# Patient Record
Sex: MALE | Race: WHITE
[De-identification: names, ages, dates, MRNs, and addresses within clinical notes are randomized per-mention and may not be internally consistent; named-entity substitution may affect disease eponyms.]

---

## 2018-02-02 ENCOUNTER — HOSPITAL ENCOUNTER (OUTPATIENT)
Dept: HOSPITAL 68 - STS | Age: 68
End: 2018-02-02
Attending: COLON & RECTAL SURGERY
Payer: COMMERCIAL

## 2018-02-02 VITALS — HEIGHT: 70 IN | BODY MASS INDEX: 23.62 KG/M2 | WEIGHT: 165 LBS

## 2018-02-02 DIAGNOSIS — I25.10: ICD-10-CM

## 2018-02-02 DIAGNOSIS — I10: ICD-10-CM

## 2018-02-02 DIAGNOSIS — Z79.82: ICD-10-CM

## 2018-02-02 DIAGNOSIS — K21.9: ICD-10-CM

## 2018-02-02 DIAGNOSIS — R15.9: Primary | ICD-10-CM

## 2018-02-02 PROCEDURE — C1894 INTRO/SHEATH, NON-LASER: HCPCS

## 2018-02-02 PROCEDURE — C1778 LEAD, NEUROSTIMULATOR: HCPCS

## 2018-02-02 NOTE — RADIOLOGY REPORT
EXAMINATION:
Intraoperative fluoroscopy
 
CLINICAL INFORMATION:
Sacral stimulator placement
 
COMPARISON:
None.
 
TECHNIQUE:
Intraoperative fluoroscopy was provided for use by Dr. Drake.  A total of 6
images were saved to PACS.
 
TOTAL FLUOROSCOPIC TIME:
0.7 minutes
 
FINDINGS\E\IMPRESSION:
Intraoperative fluoroscopy provided for use by Dr. Drake.  Please see
operative note for detailed findings. .

## 2018-02-02 NOTE — OPERATIVE REPORT
Operative/Inv Procedure Report
Surgery Date: 02/02/18
Name of Procedure:
Stage I sacral nerve stimulator implant
Placement of tined electrode into left third sacral foramen
Pre-Operative Diagnosis:
Fecal incontinence
Post-Operative Diagnosis:
Fecal incontinence
Estimated Blood Loss: abad
Surgeon/Assistant:
Miguel Drake Jr., DO
 
Anesthesia: local monitored anesthesi, block
Monitors:
Per routine
Implants:
Tined electrode into the third sacral foramen
Drains:
None
Specimens:
None
Complications:
None
Condition:
Good
Operative Indication:
This is a 68-year-old gentleman with moderate to severe fecal incontinence 
presents today for stage I sacral nerve stimulator
 
Operative/Procedure Note
Note:
Patient was taken to the operating room placed in the prone position on the 
operating room table.  He received IV antibiotics.  The lumbar sacral and 
gluteal area was prepped and draped in usual fashion.  Next using fluoroscopy we
identified the important landmarks.  The area overlying the third sacral foramen
on both sides was anesthetized.  We initially cannulated the right side had fair
motor response to stimulation.  Next we tried the third sacral foramen on the 
left side and had a much more pronounced motor response with good perineal 
chanel in good toe flexion on the left side.  A nick in the skin was made 
adjacent to the procedure wire.  The stiff wire was placed through the lumen of 
the seizure wire which was then removed.  The dilating trocar was passed over 
this stiff wire.  Good positioning was confirmed with both PA and lateral x-rays
of the sacrum.  Next the tined electrode was passed through the trocar.  Once 
the tined electrode was appropriately positioned the trocar was slowly removed 
engaging the tines.  Another x-ray was performed and positioning was excellent. 
A pocket was formed in the upper outer quadrant of the left gluteal area.  The 
tissue between the insertion site and the pocket was anesthetized with local 
anesthetic.  The tunneling ice was used to connect the 2 spaces and electrode 
was passed into the pocket.  The tined electrode was then connected to the 
temporary percutaneous wire.  In the rubber boot was used to cover this 
coupling.  The rubber boot was secured with 2 2-0 silk sutures.  The tunneling 
device was then used to tunnel the temporary wire to an exit point on the other 
side of the lumbar area.  At this point I closed the incisions.  The pocket 
incision was closed first with interrupted 30 Vicryls in the deep dermis and 
running subcuticular 4-0 Monocryl.  The 2 puncture sites were closed with 
subcuticular 4-0 Monocryl.  Mastisol Steri-Strips were placed over the 
incisions.  Occlusive dressings were then placed.  The patient tolerated the 
procedure well was converted to supine and taken the recovery area in good 
condition.  At the end this operational needle sponges and attachments were 
accounted for. 
Findings:
Electode wire in left third sacral foramen
Excellent great toe and Bellow's response at all contact points
Discharge Disposition: PACU

## 2018-02-16 ENCOUNTER — HOSPITAL ENCOUNTER (OUTPATIENT)
Dept: HOSPITAL 68 - STS | Age: 68
End: 2018-02-16
Attending: COLON & RECTAL SURGERY
Payer: COMMERCIAL

## 2018-02-16 DIAGNOSIS — R15.9: Primary | ICD-10-CM

## 2018-02-16 DIAGNOSIS — I10: ICD-10-CM

## 2018-02-16 DIAGNOSIS — G47.33: ICD-10-CM

## 2018-02-16 DIAGNOSIS — I25.10: ICD-10-CM

## 2018-02-16 PROCEDURE — C1787 PATIENT PROGR, NEUROSTIM: HCPCS

## 2018-02-16 PROCEDURE — C1767 GENERATOR, NEURO NON-RECHARG: HCPCS

## 2018-02-16 NOTE — OPERATIVE REPORT
Operative/Inv Procedure Report
Surgery Date: 02/16/18
Name of Procedure:
Stage II InterStim/ placement of sacral nerve stimulator implant
Pre-Operative Diagnosis:
Fecal incontinence
Post-Operative Diagnosis:
Fecal incontinence
Estimated Blood Loss: scant
Surgeon/Assistant:
Miguel Drake Jr., DO
 
Anesthesia: local monitored anesthesi, block
Monitors:
per routine
Implants:
InterStim/sacral nerve stimulator
Specimens:
None
Complications:
None
Condition:
Good
Operative Indication:
This is a 68-year-old woman with severe fecal incontinence and fecal urgency.  2
weeks ago he had a stage I sacral nerve stimulator implant trial.  Patient has 
had excellent results with greater than 50% reduction in his symptoms and fecal 
incontinent episodes.  Today he presents for the permanent implant
 
Operative/Procedure Note
Note:
Patient was taken to the operating room and placed in the prone position on the 
operating room table.  He received IV antibiotics and then IV sedation.  The 
left lumbar gluteal sacral area was prepped and draped in usual fashion.  Next a
block was performed using 0.5% Marcaine with epinephrine.  The previous incision
was opened with a scalpel superficially.  I bluntly  the underlying 
tissues until I encountered the wire.  The coupling device was pulled out of the
incision the rubra booty was removed and the permanent wire was  from 
the percutaneous temporary wire by unscrewing the coupling device.  The 
percutaneous wire was pulled out the exit incision.  At this point I created a 
larger pocket using electrocautery both under the superior and inferior flap of 
skin.  Once the pocket was adequate in size the device was connected to the 
tined electrode wire.  The device was placed into the pocket.  The device was 
tested and appeared to be working well.  At this point I closed the incision in 
2 layers.  First a deep dermal interrupted 3-0 Vicryl layer.  Next a 
subcuticular 4-0 Monocryl layer.  The skin was then cleansed and dried.  
Mastisol Steri-Strips were applied.  A sterile occlusive dressing was then 
applied. 
Patient tolerated the procedure well was converted to supine and then taken to 
the recovery area in good condition.  At the end this operational needle sponges
and measurements were accounted for. 
Findings:
Device responding appropriately
Discharge Disposition: PACU